# Patient Record
Sex: MALE | Race: OTHER | HISPANIC OR LATINO | URBAN - METROPOLITAN AREA
[De-identification: names, ages, dates, MRNs, and addresses within clinical notes are randomized per-mention and may not be internally consistent; named-entity substitution may affect disease eponyms.]

---

## 2022-04-26 ENCOUNTER — EMERGENCY (EMERGENCY)
Facility: HOSPITAL | Age: 52
LOS: 1 days | Discharge: HOME | End: 2022-04-26
Attending: EMERGENCY MEDICINE
Payer: SELF-PAY

## 2022-04-26 VITALS
RESPIRATION RATE: 16 BRPM | HEIGHT: 65 IN | SYSTOLIC BLOOD PRESSURE: 135 MMHG | WEIGHT: 179.9 LBS | HEART RATE: 97 BPM | DIASTOLIC BLOOD PRESSURE: 89 MMHG | TEMPERATURE: 99 F | OXYGEN SATURATION: 99 %

## 2022-04-26 PROCEDURE — 99283 EMERGENCY DEPT VISIT LOW MDM: CPT | Mod: 25

## 2022-04-26 PROCEDURE — 12002 RPR S/N/AX/GEN/TRNK2.6-7.5CM: CPT

## 2022-04-26 PROCEDURE — 90715 TDAP VACCINE 7 YRS/> IM: CPT

## 2022-04-26 PROCEDURE — 90471 IMMUNIZATION ADMIN: CPT

## 2022-04-26 RX ORDER — TETANUS TOXOID, REDUCED DIPHTHERIA TOXOID AND ACELLULAR PERTUSSIS VACCINE, ADSORBED 5; 2.5; 8; 8; 2.5 [IU]/.5ML; [IU]/.5ML; UG/.5ML; UG/.5ML; UG/.5ML
0.5 SUSPENSION INTRAMUSCULAR ONCE
Refills: 0 | Status: COMPLETED | OUTPATIENT
Start: 2022-04-26 | End: 2022-04-26

## 2022-04-26 RX ADMIN — TETANUS TOXOID, REDUCED DIPHTHERIA TOXOID AND ACELLULAR PERTUSSIS VACCINE, ADSORBED 0.5 MILLILITER(S): 5; 2.5; 8; 8; 2.5 SUSPENSION INTRAMUSCULAR at 18:09

## 2022-04-26 NOTE — ED PROVIDER NOTE - CLINICAL SUMMARY MEDICAL DECISION MAKING FREE TEXT BOX
Pt is a 51y M with no PMH presenting for R lower leg lac after cutting it with a knife accidently. Will update tetanus clean and suture wound.

## 2022-04-26 NOTE — ED PROVIDER NOTE - ATTENDING APP SHARED VISIT CONTRIBUTION OF CARE
Rosalina: I performed a face to face bedside interview with patient regarding history of present illness, review of symptoms and past medical history. I completed an independent physical exam.  I have discussed patient's plan of care with advanced care provider.   I agree with note as stated above including HISTORY OF PRESENT ILLNESS, HIV, PAST MEDICAL/SURGICAL/FAMILY/SOCIAL HISTORY, ALLERGIES AND HOME MEDICATIONS, REVIEW OF SYSTEMS, PHYSICAL EXAM, MEDICAL DECISION MAKING and any PROGRESS NOTES during the time I functioned as the attending physician for this patient  unless otherwise noted. My brief assessment is as follows: RLE with 4 cm lac from knife while working, accidental. no tendon involvement, ambulatory, unknown tetanus. no other injury, neurovasuclarly intact distally. lac irrigated and repaired, wound care/return instructions, return precautions.

## 2022-04-26 NOTE — ED PROVIDER NOTE - OBJECTIVE STATEMENT
Pt is a 51y M with no PMH presenting for R calf lac. pt states he was cutting a box with a knife and the knife slipped and cut his R outer calf. There is a 4 cm lac noted. Pt denies any other injuries. Bleeding is controlled. Pt is unsure of last tetanus. NKDA. No other complaints.

## 2022-04-26 NOTE — ED PROVIDER NOTE - NSFOLLOWUPINSTRUCTIONS_ED_ALL_ED_FT
Seguimiento para la extracción de la sutura en 7 días.  Tylenol/motrin para el dolor.  Mantenga la herida limpia y seca,  Verifique si hay signos de infección (pus/fiebre/enrojecimiento/hinchazón) y busque atención médica.       Follow up for suture removal in 7 days.  Tylenol/motrin for pain.  Keep wound clean and dry,  Check for signs of infection (pus/fever/redness/swelling) and seek medical attention.   Laceration    A laceration is a cut that goes through all of the layers of the skin and into the tissue that is right under the skin. Some lacerations heal on their own. Others need to be closed with skin adhesive strips, skin glue, stitches (sutures), or staples. Proper laceration care minimizes the risk of infection and helps the laceration to heal better.  If non-absorbable stitches or staples have been placed, they must be taken out within the time frame instructed by your healthcare provider.    SEEK IMMEDIATE MEDICAL CARE IF YOU HAVE ANY OF THE FOLLOWING SYMPTOMS: swelling around the wound, worsening pain, drainage from the wound, red streaking going away from your wound, inability to move finger or toe near the laceration, or discoloration of skin near the laceration.

## 2022-04-26 NOTE — ED PROVIDER NOTE - PATIENT PORTAL LINK FT
You can access the FollowMyHealth Patient Portal offered by [facility] by registering at the following website: [email or web address]. By joining [employer]’s FollowMyHealth portal, you will also be able to view your health information using other applications (apps) compatible with our system.

## 2022-05-04 ENCOUNTER — EMERGENCY (EMERGENCY)
Facility: HOSPITAL | Age: 52
LOS: 1 days | Discharge: HOME | End: 2022-05-04
Attending: EMERGENCY MEDICINE
Payer: SELF-PAY

## 2022-05-04 VITALS
HEIGHT: 65 IN | RESPIRATION RATE: 18 BRPM | TEMPERATURE: 98 F | HEART RATE: 92 BPM | SYSTOLIC BLOOD PRESSURE: 134 MMHG | OXYGEN SATURATION: 99 % | DIASTOLIC BLOOD PRESSURE: 84 MMHG

## 2022-05-04 PROCEDURE — L9995: CPT

## 2022-05-04 PROCEDURE — G0463: CPT

## 2022-05-04 NOTE — ED PROVIDER NOTE - DOMESTIC TRAVEL HIGH RISK QUESTION
Medication refill request:    Last Office Visit:2/14/19  Next Office Visit:    Future Appointments   Date Time Provider Harvey Mckeoni   2/27/2019  2:30 PM Kenan Hinds MD RDE [facility] [address] [facility]   3/7/2019  1:30 PM CALEB [facility] 1 [address] [facility]       Pharmacy verified. Yes    [facility] is requesting a 90 day supply. No

## 2022-05-04 NOTE — ED PROVIDER NOTE - CLINICAL SUMMARY MEDICAL DECISION MAKING FREE TEXT BOX
52yo M presenting for suture removal. well healed, no complications. removed in ED. pt educated on proper wound care. return precautions discussed.

## 2022-05-04 NOTE — ED PROVIDER NOTE - OBJECTIVE STATEMENT
50yo M presenting for suture removal. Had 6 stitches placed to right calf last week. States area healed well, no issues. Denies fever, chills, purulent drainage, erythema, worsened pain.   : Davina

## 2022-05-04 NOTE — ED PROVIDER NOTE - NSFOLLOWUPINSTRUCTIONS_ED_ALL_ED_FT
- Follow up with your doctor within 2-3 days.   - Return to the ED for any new or worsening symptoms including but not limited to worsening redness, swelling, pain, pus drainage, fevers, etc.  - Keep areas clean and dry  - Apply thin layer bacitracin to area 2x/day    Laceration    A laceration is a cut that goes through all of the layers of the skin and into the tissue that is right under the skin. Some lacerations heal on their own. Others need to be closed with skin adhesive strips, skin glue, stitches (sutures), or staples. Proper laceration care minimizes the risk of infection and helps the laceration to heal better.  If non-absorbable stitches or staples have been placed, they must be taken out within the time frame instructed by your healthcare provider.    SEEK IMMEDIATE MEDICAL CARE IF YOU HAVE ANY OF THE FOLLOWING SYMPTOMS: swelling around the wound, worsening pain, drainage from the wound, red streaking going away from your wound, inability to move finger or toe near the laceration, or discoloration of skin near the laceration.       - Seguimiento con lock médico dentro de 2-3 días.  - Regrese al servicio de urgencias por cualquier síntoma nuevo o que empeore, incluidos, entre otros, enrojecimiento, hinchazón, dolor, drenaje de pus, fiebre, etc.  - Mantener las áreas limpias y secas.  - Aplique elizabeth capa delgada de bacitracina en el área 2 veces al día    Laceración    Elizabeth laceración es un fabricio que atraviesa todas las capas de la piel y llega al tejido que está carmelina debajo de la piel. Algunas laceraciones se curan solas. Otros deben cerrarse con tiras adhesivas para la piel, pegamento para la piel, puntos (suturas) o grapas. El cuidado adecuado de la laceración minimiza el riesgo de infección y ayuda a que la laceración sane mejor. Si se mcgill colocado puntos o grapas no absorbibles, se deben retirar dentro del plazo indicado por lock proveedor de atención médica.    BUSQUE ATENCIÓN MÉDICA INMEDIATA SI TIENE ALGUNO DE LOS SIGUIENTES SÍNTOMAS: hinchazón alrededor de la herida, empeoramiento del dolor, drenaje de la herida, vetas moser que desaparecen de la herida, incapacidad para  un dedo de la mano o del pie cerca de la laceración, o decoloración de la piel cerca de la herida. laceración.

## 2022-05-04 NOTE — ED PROVIDER NOTE - PHYSICAL EXAMINATION
Gen: No acute distress, non toxic  HEENT: Mucous membranes moist, pink conjunctivae, EOMI  Neuro: A&O x 3, moving all 4 extremities  MSK: No spine or joint tenderness to palpation  Skin: +well healed laceration to right lateral calf with 6 sutures in place. no surrounding erythema

## 2022-05-04 NOTE — ED PROVIDER NOTE - PATIENT PORTAL LINK FT
You can access the FollowMyHealth Patient Portal offered by [facility] by registering at the following website: [email or web address]. By joining [employer]’s FollowMyHealth portal, you will also be able to view your health information using other applications (apps) compatible with our system.

## 2022-05-04 NOTE — ED PROCEDURE NOTE - CPROC ED POST PROC CARE GUIDE1
It's been a very long time since she has seen a Dr   She just is getting over a UTI  Has problems getting out  You have mentioned to her that if there is anything they need they are on your way home, you would stop  Will you do a house call? And    She on [employer]   Is there a prescription for that so it can go through the insurance? And     Marce from [facility] needs a prescription for PT to come the house  After the UTI, it has left weak and cannot walk    [phone] Verbal/written post procedure instructions were given to patient/caregiver./Instructed patient/caregiver to follow-up with primary care physician./Instructed patient/caregiver regarding signs and symptoms of infection./Keep the cast/splint/dressing clean and dry.

## 2022-05-04 NOTE — ED PROVIDER NOTE - NS ED ROS FT
Gen: denies fever, chills, fatigue, weight loss  Skin: +Suture removal. denies rashes, laceration, bruising  HEENT: denies visual changes, ear pain, nasal congestion, throat pain  MSK: denies joint swelling/pain, back pain, neck pain  Neuro: denies headache, dizziness, weakness, numbness

## 2023-09-07 NOTE — ED ADULT NURSE NOTE - CHIEF COMPLAINT QUOTE
Suraj Patrick is a 23 year old male here presenting with:    Reported symptoms: fell last Wednesday abraded left knee. Clear discharge. Mom concerned about infection. Cleaned would with alcohol this morning.  Has DM2.     Last tetanus 3/14/22.     Symptoms present for: 1 week    Denies: loss of consciousness, head injury, fever/chills     OTC medications: neosporin    Recent ABX use: no    Work note needed: no    Patient would like communication of their results via:        Cell Phone:   Telephone Information:   Mobile [phone] - ok to speak with mom about results     Okay to leave a message containing results? Yes    Suraj is accompanied by his mother, Leanne. Verbal permission is obtained from Suraj to discuss all aspects of his healthcare in their presence.    Visit Vitals  /74   Pulse 86   Temp 98.1 °F (36.7 °C) (Temporal)   Resp 20   Wt (!) 136.2 kg (300 lb 4.8 oz)   SpO2 96%   BMI 36.55 kg/m²            suture removal right leg

## 2023-11-03 ENCOUNTER — RX ONLY (RX ONLY)
Age: 53
End: 2023-11-03

## 2023-11-03 ENCOUNTER — OFFICE (OUTPATIENT)
Dept: URBAN - METROPOLITAN AREA CLINIC 94 | Facility: CLINIC | Age: 53
Setting detail: OPHTHALMOLOGY
End: 2023-11-03
Payer: COMMERCIAL

## 2023-11-03 DIAGNOSIS — H16.223: ICD-10-CM

## 2023-11-03 DIAGNOSIS — E11.9: ICD-10-CM

## 2023-11-03 PROCEDURE — 92002 INTRM OPH EXAM NEW PATIENT: CPT | Performed by: PHYSICIAN ASSISTANT

## 2023-11-03 PROCEDURE — 92250 FUNDUS PHOTOGRAPHY W/I&R: CPT | Performed by: PHYSICIAN ASSISTANT

## 2023-11-03 ASSESSMENT — REFRACTION_AUTOREFRACTION
OD_CYLINDER: 0.00
OS_CYLINDER: -0.25
OD_AXIS: 000
OS_AXIS: 102
OD_SPHERE: +0.75
OS_SPHERE: +0.75

## 2023-11-03 ASSESSMENT — CONFRONTATIONAL VISUAL FIELD TEST (CVF)
OD_FINDINGS: FULL
OS_FINDINGS: FULL

## 2023-11-03 ASSESSMENT — SPHEQUIV_DERIVED
OS_SPHEQUIV: 0.625
OD_SPHEQUIV: 0.75

## 2023-11-03 ASSESSMENT — SUPERFICIAL PUNCTATE KERATITIS (SPK)
OS_SPK: 1+
OD_SPK: 1+

## 2023-12-06 PROBLEM — Z78.9 OTHER SPECIFIED HEALTH STATUS: Chronic | Status: ACTIVE | Noted: 2022-05-04

## 2024-02-23 ENCOUNTER — APPOINTMENT (OUTPATIENT)
Dept: GASTROENTEROLOGY | Facility: CLINIC | Age: 54
End: 2024-02-23